# Patient Record
Sex: FEMALE | Race: BLACK OR AFRICAN AMERICAN | NOT HISPANIC OR LATINO
[De-identification: names, ages, dates, MRNs, and addresses within clinical notes are randomized per-mention and may not be internally consistent; named-entity substitution may affect disease eponyms.]

---

## 2022-10-21 ENCOUNTER — APPOINTMENT (OUTPATIENT)
Dept: OBGYN | Facility: CLINIC | Age: 52
End: 2022-10-21

## 2022-10-21 ENCOUNTER — NON-APPOINTMENT (OUTPATIENT)
Age: 52
End: 2022-10-21

## 2022-10-21 VITALS
WEIGHT: 212 LBS | SYSTOLIC BLOOD PRESSURE: 134 MMHG | DIASTOLIC BLOOD PRESSURE: 84 MMHG | HEIGHT: 63 IN | BODY MASS INDEX: 37.56 KG/M2

## 2022-10-21 DIAGNOSIS — N89.8 OTHER SPECIFIED NONINFLAMMATORY DISORDERS OF VAGINA: ICD-10-CM

## 2022-10-21 DIAGNOSIS — Z01.419 ENCOUNTER FOR GYNECOLOGICAL EXAMINATION (GENERAL) (ROUTINE) W/OUT ABNORMAL FINDINGS: ICD-10-CM

## 2022-10-21 PROBLEM — Z00.00 ENCOUNTER FOR PREVENTIVE HEALTH EXAMINATION: Status: ACTIVE | Noted: 2022-10-21

## 2022-10-21 PROCEDURE — 99202 OFFICE O/P NEW SF 15 MIN: CPT | Mod: 25

## 2022-10-21 PROCEDURE — 57100 BIOPSY VAGINAL MUCOSA SIMPLE: CPT

## 2022-10-21 NOTE — HISTORY OF PRESENT ILLNESS
[Patient reported mammogram was normal] : Patient reported mammogram was normal [Patient reported PAP Smear was normal] : Patient reported PAP Smear was normal [Patient reported colonoscopy was normal] : Patient reported colonoscopy was normal [perimenopausal] : perimenopausal [N] : Patient is not sexually active [Y] : Positive pregnancy history [Currently In Menopause] : currently in menopause [Experiencing Menopausal Sxs] : experiencing menopausal symptoms [Previously active] : previously active [FreeTextEntry1] : LOU GAY presents with an annual exam, she's currently in menopause, hasn't had her period since November 2021. She states that this is the second time she doesn't get her period for 12 months but after the 12th month she experiences bleeding and then it stops. \par \par Lou has had an abnormal pap smear one time 15-16 years ago. \par \par Octomber 21 1988- vaginal delivery, Male ([place]), No epidural\par September 10 1991- vaginal delivery, Male ([place]), No epidural\par January 2 1994- vaginal delivery, Male ( [facility]), No epidural\par July 28 1998- vaginal delivery, Male ( [facility])), No epidural\par July 18th 2009- vaginal delivery, Male ( [facility]), No epidural\par \par Ms. Gay reports that she has recently felt a small bump in her vagina and would like to have it removed.  It is not painful.   [Mammogramdate] : 2021 [PapSmeardate] : 9/2021 [ColonoscopyDate] : 2021 [PGHxTotal] : 5 [[facility] : 5

## 2022-10-21 NOTE — PHYSICAL EXAM
[Appropriately responsive] : appropriately responsive [Alert] : alert [No Acute Distress] : no acute distress [No Lymphadenopathy] : no lymphadenopathy [Regular Rate Rhythm] : regular rate rhythm [No Murmurs] : no murmurs [Clear to Auscultation B/L] : clear to auscultation bilaterally [Soft] : soft [Non-tender] : non-tender [Non-distended] : non-distended [No HSM] : No HSM [No Lesions] : no lesions [No Mass] : no mass [Oriented x3] : oriented x3 [Examination Of The Breasts] : a normal appearance [No Masses] : no breast masses were palpable [Labia Majora] : normal [Labia Minora] : normal [Cystocele] : a cystocele [Normal] : normal [Uterine Adnexae] : normal [FreeTextEntry6] : pendulous and symmetrical BL [FreeTextEntry7] : obese [FreeTextEntry4] : grade 2-3 midline cystocele, small ~ 2 mm raised lesion in the midline of the vaginal mucosa  [FreeTextEntry5] : no uterine descent

## 2022-10-25 LAB — CORE LAB BIOPSY: NORMAL

## 2022-10-28 LAB — CYTOLOGY CVX/VAG DOC THIN PREP: NORMAL

## 2022-11-04 ENCOUNTER — TRANSCRIPTION ENCOUNTER (OUTPATIENT)
Age: 52
End: 2022-11-04